# Patient Record
Sex: FEMALE | Race: WHITE | NOT HISPANIC OR LATINO | Employment: FULL TIME | URBAN - METROPOLITAN AREA
[De-identification: names, ages, dates, MRNs, and addresses within clinical notes are randomized per-mention and may not be internally consistent; named-entity substitution may affect disease eponyms.]

---

## 2021-01-28 ENCOUNTER — OFFICE VISIT (OUTPATIENT)
Dept: GASTROENTEROLOGY | Facility: CLINIC | Age: 36
End: 2021-01-28
Payer: COMMERCIAL

## 2021-01-28 VITALS
BODY MASS INDEX: 23.05 KG/M2 | DIASTOLIC BLOOD PRESSURE: 90 MMHG | HEIGHT: 64 IN | WEIGHT: 135 LBS | SYSTOLIC BLOOD PRESSURE: 141 MMHG | TEMPERATURE: 97.8 F | HEART RATE: 79 BPM

## 2021-01-28 DIAGNOSIS — K21.9 GASTROESOPHAGEAL REFLUX DISEASE, UNSPECIFIED WHETHER ESOPHAGITIS PRESENT: Primary | ICD-10-CM

## 2021-01-28 PROCEDURE — 99204 OFFICE O/P NEW MOD 45 MIN: CPT | Performed by: INTERNAL MEDICINE

## 2021-01-28 RX ORDER — CLOTRIMAZOLE AND BETAMETHASONE DIPROPIONATE 10; .64 MG/G; MG/G
CREAM TOPICAL
COMMUNITY
Start: 2020-12-23 | End: 2021-03-11

## 2021-01-28 RX ORDER — FAMOTIDINE 20 MG/1
20 TABLET, FILM COATED ORAL 2 TIMES DAILY
COMMUNITY
Start: 2021-01-14

## 2021-01-28 NOTE — PROGRESS NOTES
Consultation - [facility]  Gianna Hussein 37 y o  female MRN: [id number]  Unit/Bed#:  Encounter: [id number]        Consults    ASSESSMENT/PLAN:     1  GERD- longstanding symptoms, likely aggravated in setting of hiatal hernia or decreased LES pressure  She does have significant family history of esophageal cancer and Kumar's esophagus  Reports having undergone EGD 10 years ago, does not recall the results but does not think she had Kumar's esophagus  She has had symptoms on and off for the past 10 years, I would recommend EGD at this time to assess for Kumar's esophagus given chronicity of symptoms and family history  -  Given patient is asymptomatic can in a can hold off on H2 blocker however should she start having symptoms of acid reflux, would recommend daily use of Pepcid  I also recommended the need for EGD however patient would like to check if she is pregnant, patient is pregnant, will hold off on EGD during pregnancy  Would then schedule for endoscopy after  Patient is in agreement with plan  -  Meanwhile we went over anti-reflux measures in length  We discussed avoiding acidic foods, elevating the head of the bed, avoiding eating late at night, avoiding caffeine, carbonated drinks and alcohol   -  Will check CBC for anemia  Patient reports that she will be seeing her PCP for rest with blood work at [facility]       ______________________________________________________________________    Reason for Consult / Principal Problem: [unfilled]    HPI: Gianna Hussein is a 37y o  year old  Very pleasant female  With history of GERD presents for initial evaluation  Patient  Reports that she has had symptoms of acid reflux intermittently for the past 10 years  Patient reports that she underwent EGD 10 years ago    She reports that she has had symptoms of reflux on and off for many years and has been on Zantac followed by famotidine which has helped with the symptoms  Patient reports that she has been off of all medications recently and has been compliant with his diet which helps with the symptoms of acid reflux  Patient is mostly concerned as she does have significant family history of esophageal cancer in her paternal uncles  She reports that she had 2 uncles who passed away from esophageal cancer in 46s and 62s  She denies dysphagia, odynophagia, loss of appetite, early satiety, nausea or vomiting  She reports that she was having symptoms of acid reflux earlier this year but they have now subsided after short course of Pepcid and with dietary modifications  Does not report excessive use of NSAIDs  No melena  No recent labs  Patient reports that she has a planning on getting pregnant  No recent labs  Review of Systems: The remainder of the review of systems was negative except for the pertinent positives noted in HPI  Historical Information   Past Medical History:   Diagnosis Date    Acid reflux      History reviewed  No pertinent surgical history  Social History   Social History     Substance and Sexual Activity   Alcohol Use Yes    Frequency: 2-3 times a week    Drinks per session: 1 or 2     Social History     Substance and Sexual Activity   Drug Use Never     Social History     Tobacco Use   Smoking Status Never Smoker   Smokeless Tobacco Never Used     Family History   Problem Relation Age of Onset    Esophageal cancer Paternal Uncle     Stomach cancer Paternal Uncle     Esophageal cancer Paternal Uncle     Stomach cancer Paternal Uncle        Meds/Allergies     (Not in a hospital admission)    No current facility-administered medications for this visit  Allergies   Allergen Reactions    Dicloxacillin Hives    Nexium [Esomeprazole] Abdominal Pain       Objective     Blood pressure 141/90, pulse 79, temperature 97 8 °F (36 6 °C), height 5' 4" (1 626 m), weight 61 2 kg (135 lb)      [unfilled]    PHYSICAL EXAM     GEN: well nourished, well developed, no acute distress  HEENT: anicteric, MMM, no cervical or supraclavicular lymphadenopathy  CV: RRR, no m/r/g  CHEST: CTA b/l, no WRR  ABD: +BS, soft, NT/ND, no hepatosplenomegaly  EXT: no c/c/e  SKIN: no rashes,  NEURO: aaox3    Lab Results:   No visits with results within 1 Day(s) from this visit  Latest known visit with results is:   No results found for any previous visit       Imaging Studies: I have personally reviewed pertinent films in PACS

## 2021-01-28 NOTE — LETTER
January 28, 2021     Referral [address] [place] [id number]    Patient: Juliet Rousseau   YOB: 1985   Date of Visit: 1/28/2021       Dear Dr Jovanna Badillo:    Thank you for referring Juliet Rousseau to me for evaluation  Below are my notes for this consultation  If you have questions, please do not hesitate to call me  I look forward to following your patient along with you  Sincerely,        Ash Baires MD        CC: No Recipients  Ash Baires MD  1/28/2021 12:21 PM  Sign when Signing Visit  Consultation - [facility]  Juliet Rousseau 37 y o  female MRN: [id number]  Unit/Bed#:  Encounter: [id number]        Consults    ASSESSMENT/PLAN:     1  GERD- longstanding symptoms, likely aggravated in setting of hiatal hernia or decreased LES pressure  She does have significant family history of esophageal cancer and Kumar's esophagus  Reports having undergone EGD 10 years ago, does not recall the results but does not think she had Kumar's esophagus  She has had symptoms on and off for the past 10 years, I would recommend EGD at this time to assess for Kumar's esophagus given chronicity of symptoms and family history  -  Given patient is asymptomatic can in a can hold off on H2 blocker however should she start having symptoms of acid reflux, would recommend daily use of Pepcid  I also recommended the need for EGD however patient would like to check if she is pregnant, patient is pregnant, will hold off on EGD during pregnancy  Would then schedule for endoscopy after  Patient is in agreement with plan  -  Meanwhile we went over anti-reflux measures in length  We discussed avoiding acidic foods, elevating the head of the bed, avoiding eating late at night, avoiding caffeine, carbonated drinks and alcohol   -  Will check CBC for anemia     Patient reports that she will be seeing her PCP for rest with blood work at [facility]       ______________________________________________________________________    Reason for Consult / Principal Problem: [unfilled]    HPI: Leopoldo Richard is a 37y o  year old female  With history of GERD presents for initial evaluation  Patient  Reports that she has had symptoms of acid reflux intermittently for the past 10 years  Patient reports that she underwent EGD 10 years ago  She reports that she has had symptoms of reflux on and off for many years and has been on Zantac followed by famotidine which has helped with the symptoms  Patient reports that she has been off of all medications recently and has been compliant with his diet which helps with the symptoms of acid reflux  Patient is mostly concerned as she does have significant family history of esophageal cancer in her paternal uncles  She reports that she had 2 uncles who passed away from esophageal cancer in 46s and 62s  She denies dysphagia, odynophagia, loss of appetite, early satiety, nausea or vomiting  She reports that she was having symptoms of acid reflux earlier this year but they have now subsided after short course of Pepcid and with dietary modifications  Does not report excessive use of NSAIDs  No melena  No recent labs  Patient reports that she has a planning on getting pregnant  No recent labs  Review of Systems: The remainder of the review of systems was negative except for the pertinent positives noted in HPI  Historical Information   Past Medical History:   Diagnosis Date    Acid reflux      History reviewed  No pertinent surgical history    Social History   Social History     Substance and Sexual Activity   Alcohol Use Yes    Frequency: 2-3 times a week    Drinks per session: 1 or 2     Social History     Substance and Sexual Activity   Drug Use Never     Social History     Tobacco Use   Smoking Status Never Smoker   Smokeless Tobacco Never Used     Family History   Problem Relation Age of Onset    Esophageal cancer Paternal Uncle     Stomach cancer Paternal Uncle     Esophageal cancer Paternal Uncle     Stomach cancer Paternal Uncle        Meds/Allergies     (Not in a hospital admission)    No current facility-administered medications for this visit  Allergies   Allergen Reactions    Dicloxacillin Hives    Nexium [Esomeprazole] Abdominal Pain       Objective     Blood pressure 141/90, pulse 79, temperature 97 8 °F (36 6 °C), height 5' 4" (1 626 m), weight 61 2 kg (135 lb)  [unfilled]    PHYSICAL EXAM     GEN: well nourished, well developed, no acute distress  HEENT: anicteric, MMM, no cervical or supraclavicular lymphadenopathy  CV: RRR, no m/r/g  CHEST: CTA b/l, no WRR  ABD: +BS, soft, NT/ND, no hepatosplenomegaly  EXT: no c/c/e  SKIN: no rashes,  NEURO: aaox3    Lab Results:   No visits with results within 1 Day(s) from this visit  Latest known visit with results is:   No results found for any previous visit       Imaging Studies: I have personally reviewed pertinent films in PACS

## 2021-02-11 ENCOUNTER — OFFICE VISIT (OUTPATIENT)
Dept: DERMATOLOGY | Age: 36
End: 2021-02-11
Payer: COMMERCIAL

## 2021-02-11 VITALS — HEIGHT: 64 IN | TEMPERATURE: 97.5 F | BODY MASS INDEX: 22.98 KG/M2 | WEIGHT: 134.6 LBS

## 2021-02-11 DIAGNOSIS — D22.70 MULTIPLE BENIGN MELANOCYTIC NEVI OF UPPER EXTREMITY, LOWER EXTREMITY, AND TRUNK: Primary | ICD-10-CM

## 2021-02-11 DIAGNOSIS — Z80.8 FAMILY HISTORY OF SKIN CANCER: ICD-10-CM

## 2021-02-11 DIAGNOSIS — D22.5 MULTIPLE BENIGN MELANOCYTIC NEVI OF UPPER EXTREMITY, LOWER EXTREMITY, AND TRUNK: Primary | ICD-10-CM

## 2021-02-11 DIAGNOSIS — L81.4 SOLAR LENTIGO: ICD-10-CM

## 2021-02-11 DIAGNOSIS — D22.60 MULTIPLE BENIGN MELANOCYTIC NEVI OF UPPER EXTREMITY, LOWER EXTREMITY, AND TRUNK: Primary | ICD-10-CM

## 2021-02-11 DIAGNOSIS — D18.01 CHERRY ANGIOMA: ICD-10-CM

## 2021-02-11 PROCEDURE — 99204 OFFICE O/P NEW MOD 45 MIN: CPT | Performed by: DERMATOLOGY

## 2021-02-11 NOTE — PATIENT INSTRUCTIONS
LUNSFORD ANGIOMAS     Assessment and Plan:  Based on a thorough discussion of this condition and the management approach to it (including a comprehensive discussion of the known risks, side effects and potential benefits of treatment), the patient (family) agrees to implement the following specific plan:  · Reassure benign           SOLAR LENTIGINES      Assessment and Plan:  Based on a thorough discussion of this condition and the management approach to it (including a comprehensive discussion of the known risks, side effects and potential benefits of treatment), the patient (family) agrees to implement the following specific plan:  · Reassure benign  · Use sun protection  Apply SPF 30 or higher at least three times a day  Wear sun protecting clothing and hats  MULTIPLE MELANOCYTIC NEVI ("Moles")        Assessment and Plan:  Based on a thorough discussion of this condition and the management approach to it (including a comprehensive discussion of the known risks, side effects and potential benefits of treatment), the patient (family) agrees to implement the following specific plan:  · Reassure benign  · Monitor for changes  · Use sun protection  Apply SPF 30 or higher at least three times a day  Wear sun protecting clothing and hats  Worrisome signs of skin malignancy discussed, questions answered  Regular self-skin check discussed  Advised to call or return to office if patient notices any spots of concern, rapidly growing/changing lesions, bleeding lesions, non-healing lesions  Advised regular SPF use

## 2021-02-11 NOTE — PROGRESS NOTES
[facility] Note     Patient Name: Cuong Suarez  Encounter Date: 21     Have you been cared for by a [facility] Dermatologist in the last 3 years and, if so, which one? No    · Have you traveled outside of the [address] [place] [address] in the past 3 months or outside of the [facility] area in the last 2 weeks? No     May we call your Preferred Phone number to discuss your specific medical information? Yes     May we leave a detailed message that includes your specific medical information? Yes      Today's Chief Concerns:   Concern #1:  Skin exam   Concern #2:      Past Medical History:  Have you personally ever had or currently have any of the following? · Skin cancer (such as Melanoma, Basal Cell Carcinoma, Squamous Cell Carcinoma? (If Yes, please provide more detail)- No  · Eczema: No  · Psoriasis: No  · HIV/AIDS: No  · Hepatitis B or C: No  · Tuberculosis: No  · Systemic Immunosuppression such as Diabetes, Biologic or Immunotherapy, Chemotherapy, Organ Transplantation, Bone Marrow Transplantation (If YES, please provide more detail): No  · Radiation Treatment (If YES, please provide more detail): No  · Any other major medical conditions/concerns? (If Yes, which types)- Asthma    Social History:     What is/was your primary occupation? [employer]     What are your hobbies/past-times? Family History:  Have any of your "first degree relatives" (parent, brother, sister, or child) had any of the following       · Skin cancer such as Melanoma or Merkel Cell Carcinoma or Pancreatic Cancer? YES, father, Basal cell carcinoma  · Eczema, Asthma, Hay Fever or Seasonal Allergies: No  · Psoriasis or Psoriatic Arthritis: No  · Do any other medical conditions seem to run in your family? If Yes, what condition and which relatives?   YES, mother  cervical cancer    Current Medications:   (please update all dermatological medications before printing patient's AVS!)      Current Outpatient Medications:     famotidine (PEPCID) 20 mg tablet, , Disp: , Rfl:     Prenatal Vit-Fe Fumarate-FA (PRENATAL COMPLETE PO), Take by mouth, Disp: , Rfl:     clotrimazole-betamethasone (LOTRISONE) 1-0 05 % cream, , Disp: , Rfl:       Review of Systems:  Have you recently had or currently have any of the following? If YES, what are you doing for the problem? · Fever, chills or unintended weight loss: No  · Sudden loss or change in your vision: No  · Nausea, vomiting or blood in your stool: No  · Painful or swollen joints: No  · Wheezing or cough: No  · Changing mole or non-healing wound: No  · Nosebleeds: No  · Excessive sweating: No  · Easy or prolonged bleeding? No  · Over the last 2 weeks, how often have you been bothered by the following problems? · Taking little interest or pleasure in doing things: 1 - Not at All  · Feeling down, depressed, or hopeless: 1 - Not at All  · Rapid heartbeat with epinephrine:  No    · FEMALES ONLY:    · Are you pregnant or planning to become pregnant? YES, planning on becoming pregnant  · Are you currently or planning to be nursing or breast feeding? YES, planning on breast feeding    · Any known allergies? Allergies   Allergen Reactions    Dicloxacillin Hives    Nexium [Esomeprazole] Abdominal Pain   ·       Physical Exam:     Was a chaperone (Derm Clinical Assistant) present throughout the entire Physical Exam? Yes     Did the Dermatology Team specifically  the patient on the importance of a Full Skin Exam to be sure that nothing is missed clinically?  Yes}  o Did the patient ultimately request or accept a Full Skin Exam?  Yes  o Did the patient specifically refuse to have the areas "under-the-bra" examined by the Dermatologist? No  o Did the patient specifically refuse to have the areas "under-the-underwear" examined by the Dermatologist? No    CONSTITUTIONAL:   Vitals:    02/11/21 1301   Temp: 97 5 °F (36 4 °C)   TempSrc: Probe Weight: 61 1 kg (134 lb 9 6 oz)   Height: 5' 4" (1 626 m)           PSYCH: Normal mood and affect  EYES: Normal conjunctiva  ENT: Normal lips and oral mucosa  CARDIOVASCULAR: No edema  RESPIRATORY: Normal respirations  HEME/LYMPH/IMMUNO:  No regional lymphadenopathy except as noted below in "ASSESSMENT AND PLAN BY DIAGNOSIS"    SKIN:  FULL ORGAN SYSTEM EXAM   Hair, Scalp, Ears, Face Normal except as noted below in Assessment   Neck, Cervical Chain Nodes Normal except as noted below in Assessment   Right Arm/Hand/Fingers Normal except as noted below in Assessment   Left Arm/Hand/Fingers Normal except as noted below in Assessment   Chest/Breasts/Axillae Viewed areas Normal except as noted below in Assessment   Abdomen, Umbilicus Normal except as noted below in Assessment   Back/Spine Normal except as noted below in Assessment   Groin/Genitalia/Buttocks Normal except as noted below in Assessment   Right Leg, Foot, Toes Normal except as noted below in Assessment   Left Leg, Foot, Toes Normal except as noted below in Assessment        Assessment and Plan by Diagnosis:    History of Present Condition:     Duration:  How long has this been an issue for you?    o  nothing   Location Affected:  Where on the body is this affecting you?    o  N/A   Quality:  Is there any bleeding, pain, itch, burning/irritation, or redness associated with the skin lesion? o  no   Severity:  Describe any bleeding, pain, itch, burning/irritation, or redness on a scale of 1 to 10 (with 10 being the worst)    o  N/A   Timing:  Does this condition seem to be there pretty constantly or do you notice it more at specific times throughout the day?    o  no   Context:  Have you ever noticed that this condition seems to be associated with specific activities you do?    o  no   Modifying Factors:    o Anything that seems to make the condition worse?    -  no  o What have you tried to do to make the condition better?    -  no   Associated Signs and Symptoms:  Does this skin lesion seem to be associated with any of the following:  o nothing    CHERRY ANGIOMAS     Physical Exam:  · Anatomic Location Affected:  Trunk and extremites  · Morphological Description:  Scattered cherry red papules  · Denies pain, itch, bleeding  No treatments tried  Present for years  Present constantly; no modifying factors which make it worse or better  Assessment and Plan:  Based on a thorough discussion of this condition and the management approach to it (including a comprehensive discussion of the known risks, side effects and potential benefits of treatment), the patient (family) agrees to implement the following specific plan:  · Reassure benign           SOLAR LENTIGINES      Physical Exam:   Anatomic Location Affected:  Sun exposed areas of back, chest, arms, legs   Morphological Description:  Multiple scattered brown to tan evenly pigmented macules    Denies pain, itch, bleeding  No treatments tried  Present for months - years  Reports getting newer lesions with sun exposure  Assessment and Plan:  Based on a thorough discussion of this condition and the management approach to it (including a comprehensive discussion of the known risks, side effects and potential benefits of treatment), the patient (family) agrees to implement the following specific plan:  · Reassure benign  · Use sun protection  Apply SPF 30 or higher at least three times a day  Wear sun protecting clothing and hats  MULTIPLE MELANOCYTIC NEVI ("Moles")     Physical Exam:  · Anatomic Location Affected: Trunk and extremities  · Morphological Description:  Scattered, round to ovoid, symmetrical-appearing, even bordered, skin colored to dark brown macules/papules  · Denies pain, itch, bleeding  No treatments tried  Present for years  Present constantly; no modifying factors which make it worse or better  Denies actively changing or growing moles        Assessment and Plan:  Based on a thorough discussion of this condition and the management approach to it (including a comprehensive discussion of the known risks, side effects and potential benefits of treatment), the patient (family) agrees to implement the following specific plan:  · Reassure benign  · Monitor for changes  · Use sun protection  Apply SPF 30 or higher at least three times a day  Wear sun protecting clothing and hats  Worrisome signs of skin malignancy discussed, questions answered  Regular self-skin check discussed  Advised to call or return to office if patient notices any spots of concern, rapidly growing/changing lesions, bleeding lesions, non-healing lesions  Advised regular SPF use       Scribe Attestation    I,:  Brit Harmon am acting as a scribe while in the presence of the attending physician :       I,:  King Saunders MD personally performed the services described in this documentation    as scribed in my presence :

## 2021-02-12 LAB
BASOPHILS # BLD AUTO: 39 CELLS/UL (ref 0–200)
BASOPHILS NFR BLD AUTO: 0.7 %
EOSINOPHIL # BLD AUTO: 179 CELLS/UL (ref 15–500)
EOSINOPHIL NFR BLD AUTO: 3.2 %
ERYTHROCYTE [DISTWIDTH] IN BLOOD BY AUTOMATED COUNT: 12.8 % (ref 11–15)
HCT VFR BLD AUTO: 42.2 % (ref 35–45)
HGB BLD-MCNC: 14.2 G/DL (ref 11.7–15.5)
LYMPHOCYTES # BLD AUTO: 1798 CELLS/UL (ref 850–3900)
LYMPHOCYTES NFR BLD AUTO: 32.1 %
MCH RBC QN AUTO: 29.4 PG (ref 27–33)
MCHC RBC AUTO-ENTMCNC: 33.6 G/DL (ref 32–36)
MCV RBC AUTO: 87.4 FL (ref 80–100)
MONOCYTES # BLD AUTO: 392 CELLS/UL (ref 200–950)
MONOCYTES NFR BLD AUTO: 7 %
NEUTROPHILS # BLD AUTO: 3192 CELLS/UL (ref 1500–7800)
NEUTROPHILS NFR BLD AUTO: 57 %
PLATELET # BLD AUTO: 317 THOUSAND/UL (ref 140–400)
PMV BLD REES-ECKER: 10.4 FL (ref 7.5–12.5)
RBC # BLD AUTO: 4.83 MILLION/UL (ref 3.8–5.1)
WBC # BLD AUTO: 5.6 THOUSAND/UL (ref 3.8–10.8)

## 2021-03-10 DIAGNOSIS — Z11.59 SCREENING FOR VIRAL DISEASE: ICD-10-CM

## 2021-03-10 PROCEDURE — U0003 INFECTIOUS AGENT DETECTION BY NUCLEIC ACID (DNA OR RNA); SEVERE ACUTE RESPIRATORY SYNDROME CORONAVIRUS 2 (SARS-COV-2) (CORONAVIRUS DISEASE [COVID-19]), AMPLIFIED PROBE TECHNIQUE, MAKING USE OF HIGH THROUGHPUT TECHNOLOGIES AS DESCRIBED BY CMS-2020-01-R: HCPCS | Performed by: INTERNAL MEDICINE

## 2021-03-10 PROCEDURE — U0005 INFEC AGEN DETEC AMPLI PROBE: HCPCS | Performed by: INTERNAL MEDICINE

## 2021-03-11 LAB — SARS-COV-2 RNA RESP QL NAA+PROBE: NEGATIVE

## 2021-03-11 RX ORDER — FLUTICASONE PROPIONATE 110 UG/1
1 AEROSOL, METERED RESPIRATORY (INHALATION) 2 TIMES DAILY
COMMUNITY

## 2021-03-11 NOTE — PRE-PROCEDURE INSTRUCTIONS
Pre-Surgery Instructions:   Medication Instructions    famotidine (PEPCID) 20 mg tablet Instructed patient per Anesthesia Guidelines   fluticasone (FLOVENT HFA) 110 MCG/ACT inhaler Pt may use if needed    Prenatal Vit-Fe Fumarate-FA (PRENATAL COMPLETE PO) Instructed patient per Anesthesia Guidelines  Pt to follow Dr Mo Posada instructions     Denis Murphy [phone]

## 2021-03-15 ENCOUNTER — ANESTHESIA EVENT (OUTPATIENT)
Dept: GASTROENTEROLOGY | Facility: AMBULARY SURGERY CENTER | Age: 36
End: 2021-03-15

## 2021-03-16 ENCOUNTER — HOSPITAL ENCOUNTER (OUTPATIENT)
Dept: GASTROENTEROLOGY | Facility: AMBULARY SURGERY CENTER | Age: 36
Setting detail: OUTPATIENT SURGERY
Discharge: HOME | End: 2021-03-16
Attending: INTERNAL MEDICINE | Admitting: INTERNAL MEDICINE
Payer: COMMERCIAL

## 2021-03-16 ENCOUNTER — ANESTHESIA (OUTPATIENT)
Dept: GASTROENTEROLOGY | Facility: AMBULARY SURGERY CENTER | Age: 36
End: 2021-03-16

## 2021-03-16 VITALS
DIASTOLIC BLOOD PRESSURE: 54 MMHG | OXYGEN SATURATION: 97 % | SYSTOLIC BLOOD PRESSURE: 93 MMHG | HEART RATE: 69 BPM | TEMPERATURE: 97.5 F | WEIGHT: 134 LBS | RESPIRATION RATE: 18 BRPM | HEIGHT: 64 IN | BODY MASS INDEX: 22.88 KG/M2

## 2021-03-16 DIAGNOSIS — K21.9 GASTROESOPHAGEAL REFLUX DISEASE, UNSPECIFIED WHETHER ESOPHAGITIS PRESENT: ICD-10-CM

## 2021-03-16 PROBLEM — J45.909 ASTHMA: Status: ACTIVE | Noted: 2021-03-16

## 2021-03-16 LAB
EXT PREGNANCY TEST URINE: NEGATIVE
EXT. CONTROL: NORMAL

## 2021-03-16 PROCEDURE — 88305 TISSUE EXAM BY PATHOLOGIST: CPT | Performed by: PATHOLOGY

## 2021-03-16 PROCEDURE — 43239 EGD BIOPSY SINGLE/MULTIPLE: CPT | Performed by: INTERNAL MEDICINE

## 2021-03-16 PROCEDURE — 81025 URINE PREGNANCY TEST: CPT | Performed by: ANESTHESIOLOGY

## 2021-03-16 RX ORDER — PROPOFOL 10 MG/ML
INJECTION, EMULSION INTRAVENOUS AS NEEDED
Status: DISCONTINUED | OUTPATIENT
Start: 2021-03-16 | End: 2021-03-16

## 2021-03-16 RX ORDER — SODIUM CHLORIDE, SODIUM LACTATE, POTASSIUM CHLORIDE, CALCIUM CHLORIDE 600; 310; 30; 20 MG/100ML; MG/100ML; MG/100ML; MG/100ML
125 INJECTION, SOLUTION INTRAVENOUS CONTINUOUS
Status: DISCONTINUED | OUTPATIENT
Start: 2021-03-16 | End: 2021-03-20 | Stop reason: HOSPADM

## 2021-03-16 RX ORDER — ONDANSETRON 2 MG/ML
4 INJECTION INTRAMUSCULAR; INTRAVENOUS ONCE AS NEEDED
Status: CANCELLED | OUTPATIENT
Start: 2021-03-16

## 2021-03-16 RX ORDER — LIDOCAINE HYDROCHLORIDE 10 MG/ML
INJECTION, SOLUTION EPIDURAL; INFILTRATION; INTRACAUDAL; PERINEURAL AS NEEDED
Status: DISCONTINUED | OUTPATIENT
Start: 2021-03-16 | End: 2021-03-16

## 2021-03-16 RX ORDER — PROPOFOL 10 MG/ML
INJECTION, EMULSION INTRAVENOUS CONTINUOUS PRN
Status: DISCONTINUED | OUTPATIENT
Start: 2021-03-16 | End: 2021-03-16

## 2021-03-16 RX ORDER — SODIUM CHLORIDE, SODIUM LACTATE, POTASSIUM CHLORIDE, CALCIUM CHLORIDE 600; 310; 30; 20 MG/100ML; MG/100ML; MG/100ML; MG/100ML
INJECTION, SOLUTION INTRAVENOUS CONTINUOUS PRN
Status: DISCONTINUED | OUTPATIENT
Start: 2021-03-16 | End: 2021-03-16

## 2021-03-16 RX ADMIN — SODIUM CHLORIDE, SODIUM LACTATE, POTASSIUM CHLORIDE, AND CALCIUM CHLORIDE 125 ML/HR: .6; .31; .03; .02 INJECTION, SOLUTION INTRAVENOUS at 08:14

## 2021-03-16 RX ADMIN — PROPOFOL 150 MG: 10 INJECTION, EMULSION INTRAVENOUS at 08:34

## 2021-03-16 RX ADMIN — LIDOCAINE HYDROCHLORIDE 60 MG: 10 INJECTION, SOLUTION EPIDURAL; INFILTRATION; INTRACAUDAL; PERINEURAL at 08:34

## 2021-03-16 RX ADMIN — PROPOFOL 80 MCG/KG/MIN: 10 INJECTION, EMULSION INTRAVENOUS at 08:34

## 2021-03-16 NOTE — ANESTHESIA PREPROCEDURE EVALUATION
Procedure:  EGD    Relevant Problems   ANESTHESIA (within normal limits)      CARDIO (within normal limits)      ENDO (within normal limits)      GI/HEPATIC   (+) Gastroesophageal reflux disease      PULMONARY   (+) Asthma        Physical Exam    Airway    Mallampati score: I  TM Distance: >3 FB  Neck ROM: full     Dental   No notable dental hx     Cardiovascular  Rhythm: regular, Rate: normal,     Pulmonary  Breath sounds clear to auscultation,     Other Findings        Anesthesia Plan  ASA Score- 2     Anesthesia Type- IV sedation with anesthesia with ASA Monitors  Additional Monitors:   Airway Plan:           Plan Factors-Exercise tolerance (METS): >4 METS  Chart reviewed  Existing labs reviewed  Patient summary reviewed  Patient is not a current smoker  Induction- intravenous  Postoperative Plan-     Informed Consent- Anesthetic plan and risks discussed with patient  I personally reviewed this patient with the CRNA  Discussed and agreed on the Anesthesia Plan with the EVERETT Montes

## 2021-03-16 NOTE — ANESTHESIA POSTPROCEDURE EVALUATION
Post-Op Assessment Note    CV Status:  Stable  Pain Score: 0    Pain management: adequate     Mental Status:  Alert and awake   Hydration Status:  Euvolemic   PONV Controlled:  Controlled   Airway Patency:  Patent      Post Op Vitals Reviewed: Yes      Staff: CRNA         No complications documented      BP 95/53   Temp      Pulse  71   Resp   18   SpO2   99%

## 2021-03-16 NOTE — H&P
History and Physical - [facility]  David Chen 37 y o  female MRN: [id number]    HPI: David Chen is a 37y o  year old female who presents for evaluation of GERD  Review of Systems    Historical Information   Past Medical History:   Diagnosis Date    Anesthesia complication     difficulty waking up-Dr  "made up his own cocktail"    Asthma     allergy/seasonal allergy    GERD (gastroesophageal reflux disease)      Past Surgical History:   Procedure Laterality Date    APPENDECTOMY  2011    DILATION AND CURETTAGE OF UTERUS  2018    miscarriage    EGD      WISDOM TOOTH EXTRACTION      x4     Social History   Social History     Substance and Sexual Activity   Alcohol Use Yes    Frequency: 2-3 times a week    Drinks per session: 1 or 2     Social History     Substance and Sexual Activity   Drug Use Never     Social History     Tobacco Use   Smoking Status Never Smoker   Smokeless Tobacco Never Used     Family History   Problem Relation Age of Onset    Cancer Mother         cervical    Liver disease Father         x2 liver transplants    Diabetes Father     Hepatitis Father         C    Esophageal cancer Paternal Uncle     Stomach cancer Paternal Uncle     Esophageal cancer Paternal Uncle     Stomach cancer Paternal Uncle     Anxiety disorder Sister        Meds/Allergies     (Not in a hospital admission)      Allergies   Allergen Reactions    Dicloxacillin Hives    Nexium [Esomeprazole] Abdominal Pain    Strawberry C [Ascorbate] Hives       Objective     /65   Pulse 65   Temp 97 5 °F (36 4 °C) (Temporal)   Resp 18   Ht 5' 4" (1 626 m)   Wt 60 8 kg (134 lb)   LMP 03/09/2021 (Exact Date)   SpO2 100%   Breastfeeding No Comment: negative  BMI 23 00 kg/m²       PHYSICAL EXAM    Gen: NAD  CV: RRR  CHEST: Clear  ABD: soft, NT/ND  EXT: no edema  Neuro: AAO      ASSESSMENT/PLAN:  This is a 37y o  year old female here for GERD      PLAN:   Procedure: EGD

## 2021-03-24 DIAGNOSIS — K22.70 BARRETT'S ESOPHAGUS WITHOUT DYSPLASIA: Primary | ICD-10-CM

## 2021-03-24 RX ORDER — OMEPRAZOLE 40 MG/1
40 CAPSULE, DELAYED RELEASE ORAL DAILY
Qty: 30 CAPSULE | Refills: 3 | Status: SHIPPED | OUTPATIENT
Start: 2021-03-24

## 2022-07-12 ENCOUNTER — APPOINTMENT (EMERGENCY)
Dept: RADIOLOGY | Facility: HOSPITAL | Age: 37
End: 2022-07-12
Payer: COMMERCIAL

## 2022-07-12 ENCOUNTER — HOSPITAL ENCOUNTER (EMERGENCY)
Facility: HOSPITAL | Age: 37
Discharge: HOME | End: 2022-07-12
Attending: EMERGENCY MEDICINE
Payer: COMMERCIAL

## 2022-07-12 VITALS
BODY MASS INDEX: 23.29 KG/M2 | RESPIRATION RATE: 16 BRPM | OXYGEN SATURATION: 99 % | DIASTOLIC BLOOD PRESSURE: 79 MMHG | TEMPERATURE: 98.9 F | HEART RATE: 86 BPM | WEIGHT: 136.4 LBS | HEIGHT: 64 IN | SYSTOLIC BLOOD PRESSURE: 126 MMHG

## 2022-07-12 DIAGNOSIS — S93.609A FOOT SPRAIN: Primary | ICD-10-CM

## 2022-07-12 PROCEDURE — 99284 EMERGENCY DEPT VISIT MOD MDM: CPT | Performed by: EMERGENCY MEDICINE

## 2022-07-12 PROCEDURE — 73610 X-RAY EXAM OF ANKLE: CPT

## 2022-07-12 PROCEDURE — 73630 X-RAY EXAM OF FOOT: CPT

## 2022-07-12 PROCEDURE — 99283 EMERGENCY DEPT VISIT LOW MDM: CPT

## 2022-07-12 RX ORDER — DIPHENOXYLATE HYDROCHLORIDE AND ATROPINE SULFATE 2.5; .025 MG/1; MG/1
1 TABLET ORAL DAILY
COMMUNITY

## 2022-07-13 NOTE — ED PROVIDER NOTES
History  Chief Complaint   Patient presents with    Foot Injury     Pt  States 3 days ago fell into a pool  hitting her left leg on concrete, has scratches and scabs but states top of left foot hurts when resting and when putting pressure on it  HPI    This is a 32 yo F who presents today with left foot pain  States 3 days ago she tripped alongside the pool  She has some abrasions to her left knee and foot area  Patient states she did not have much pain  Pain was localized to the abrasions  Patient states the neck states she was doing fine  She had mild pain  States got a little bit swollen but tolerable  States woke up this morning pain was severe  Has issues cutting weight on her foot  Denies hitting her head  No other complaints  The abrasions are healing its own  Patient states pain localized to the swelling area  37 female that presents with left foot pain  Will do some x-rays    Prior to Admission Medications   Prescriptions Last Dose Informant Patient Reported? Taking?    Prenatal Vit-Fe Fumarate-FA (PRENATAL COMPLETE PO) Not Taking at Unknown time Self Yes No   Sig: Take by mouth every morning    Patient not taking: Reported on 2022   famotidine (PEPCID) 20 mg tablet Not Taking at Unknown time Self Yes No   Si mg 2 (two) times a day    Patient not taking: Reported on 2022   fluticasone (FLOVENT HFA) 110 MCG/ACT inhaler Past Month at Unknown time  Yes Yes   Sig: Inhale 1 puff 2 (two) times a day Rinse mouth after use    multivitamin (THERAGRAN) TABS 2022 at Unknown time  Yes Yes   Sig: Take 1 tablet by mouth daily   omeprazole (PriLOSEC) 40 MG capsule Not Taking at Unknown time  No No   Sig: Take 1 capsule (40 mg total) by mouth daily   Patient not taking: Reported on 2022      Facility-Administered Medications: None       Past Medical History:   Diagnosis Date    Anesthesia complication     difficulty waking up-Dr  "made up his own cocktail"    Asthma allergy/seasonal allergy    GERD (gastroesophageal reflux disease)        Past Surgical History:   Procedure Laterality Date    APPENDECTOMY  2011    DILATION AND CURETTAGE OF UTERUS  2018    miscarriage    EGD      WISDOM TOOTH EXTRACTION      x4       Family History   Problem Relation Age of Onset    Cancer Mother         cervical    Liver disease Father         x2 liver transplants    Diabetes Father     Hepatitis Father         C    Esophageal cancer Paternal Uncle     Stomach cancer Paternal Uncle     Esophageal cancer Paternal Uncle     Stomach cancer Paternal Uncle     Anxiety disorder Sister      I have reviewed and agree with the history as documented  E-Cigarette/Vaping    E-Cigarette Use Never User      E-Cigarette/Vaping Substances    Nicotine No     THC No     CBD No     Flavoring No     Other No     Unknown No      Social History     Tobacco Use    Smoking status: Never Smoker    Smokeless tobacco: Never Used   Vaping Use    Vaping Use: Never used   Substance Use Topics    Alcohol use: Yes     Comment: occasionally    Drug use: Never       Review of Systems   Constitutional: Negative for diaphoresis and fever  HENT: Negative for sneezing and sore throat  Eyes: Negative for photophobia and visual disturbance  Respiratory: Negative for cough, shortness of breath and wheezing  Cardiovascular: Negative for chest pain and palpitations  Gastrointestinal: Negative for abdominal pain, constipation, diarrhea, nausea and vomiting  Genitourinary: Negative for frequency, hematuria and urgency  Musculoskeletal: Negative for back pain  Left foot pain     Neurological: Negative for dizziness and weakness  Physical Exam  Physical Exam  Vitals and nursing note reviewed  Constitutional:       General: She is not in acute distress  Appearance: She is well-developed  HENT:      Head: Normocephalic and atraumatic     Eyes:      Conjunctiva/sclera: Conjunctivae normal    Cardiovascular:      Rate and Rhythm: Normal rate and regular rhythm  Heart sounds: No murmur heard  Pulmonary:      Effort: Pulmonary effort is normal  No respiratory distress  Breath sounds: Normal breath sounds  Abdominal:      Palpations: Abdomen is soft  Tenderness: There is no abdominal tenderness  Musculoskeletal:      Cervical back: Neck supple  Comments: Left foot lateral mallelous swelling but no tenderness  Tenderness mid foot  superfiical abrasion on foot healing  No signs of infection  Normal DP pulses  Soft compartments  Left knee superficial abrasion, no tenderness or swelling  Skin:     General: Skin is warm and dry  Capillary Refill: Capillary refill takes less than 2 seconds  Neurological:      General: No focal deficit present  Mental Status: She is alert  Psychiatric:         Mood and Affect: Mood normal          Vital Signs  ED Triage Vitals [07/12/22 2133]   Temperature Pulse Respirations Blood Pressure SpO2   98 9 °F (37 2 °C) 86 16 126/79 99 %      Temp Source Heart Rate Source Patient Position - Orthostatic VS BP Location FiO2 (%)   Tympanic -- -- -- --      Pain Score       4           Vitals:    07/12/22 2133   BP: 126/79   Pulse: 86         Visual Acuity      ED Medications  Medications - No data to display    Diagnostic Studies  Results Reviewed     None                 XR foot 3+ views LEFT   Final Result by Nelida Causey MD (07/13 0555)      Possible fracture of the base of the 3rd metatarsal on the oblique view  Fracture not excluded  Further assessment recommended  Soft tissue swelling over the metatarsal phalangeal joint region  Workstation performed: [id number]         XR ankle 3+ views LEFT   Final Result by Livia Gomes MD (07/13 8495)      No acute osseous abnormality              Workstation performed: [id number]                    Procedures  Procedures         ED Course MDM    Disposition  Final diagnoses: Foot sprain     Time reflects when diagnosis was documented in both MDM as applicable and the Disposition within this note     Time User Action Codes Description Comment    7/12/2022 10:45 PM [address], [place]zaiah [place] [address]  [address]  [J73 [id number]] Foot sprain       ED Disposition     ED Disposition   Discharge    Condition   Stable    Date/Time   Tue Jul 12, 2022 10:45 PM    Comment   Luzma Escort discharge to home/self care  Follow-up Information     Follow up With Specialties Details Why Contact Info Additional [address] [facility] Orthopedic Surgery Schedule an appointment as soon as possible for a visit   [address], [place] [address] [address] [address] [place] [facility], [address] [facility] [address], [place], [place], [address] [place] [address]          Discharge Medication List as of 7/12/2022 10:45 PM      CONTINUE these medications which have NOT CHANGED    Details   famotidine (PEPCID) 20 mg tablet 20 mg 2 (two) times a day , Starting Thu 1/14/2021, Historical Med      fluticasone (FLOVENT HFA) 110 MCG/ACT inhaler Inhale 1 puff 2 (two) times a day Rinse mouth after use , Historical Med      multivitamin (THERAGRAN) TABS Take 1 tablet by mouth daily, Historical Med      omeprazole (PriLOSEC) 40 MG capsule Take 1 capsule (40 mg total) by mouth daily, Starting Wed 3/24/2021, Normal      Prenatal Vit-Fe Fumarate-FA (PRENATAL COMPLETE PO) Take by mouth every morning , Historical Med             No discharge procedures on file      PDMP Review     None          ED Provider  Electronically Signed by           Ce Mcdonough MD  07/14/22 9174

## 2022-07-15 ENCOUNTER — OFFICE VISIT (OUTPATIENT)
Dept: OBGYN CLINIC | Facility: CLINIC | Age: 37
End: 2022-07-15
Payer: COMMERCIAL

## 2022-07-15 VITALS
HEART RATE: 78 BPM | HEIGHT: 64 IN | SYSTOLIC BLOOD PRESSURE: 125 MMHG | TEMPERATURE: 98.2 F | BODY MASS INDEX: 23.41 KG/M2 | DIASTOLIC BLOOD PRESSURE: 75 MMHG | RESPIRATION RATE: 19 BRPM

## 2022-07-15 DIAGNOSIS — S92.335A CLOSED NONDISPLACED FRACTURE OF THIRD METATARSAL BONE OF LEFT FOOT, INITIAL ENCOUNTER: Primary | ICD-10-CM

## 2022-07-15 PROCEDURE — 99204 OFFICE O/P NEW MOD 45 MIN: CPT | Performed by: PHYSICIAN ASSISTANT

## 2022-07-15 NOTE — PROGRESS NOTES
Assessment/Plan:  1  Closed nondisplaced fracture of third metatarsal bone of left foot, initial encounter       The patient does have a subtle fracture of the base of 3rd metatarsal   She was given a short cam boot to wear today  She may weightbear as tolerated  She should elevate the foot to help her swelling  She has no signs of DVT today  She will call immediately if she develops any calf pain or cramping  She will follow up in 4 weeks with repeat x-rays of her foot  Subjective:   Norberto Fothergill is a 31 y o  female who presents today for evaluation of her left foot  She fell into a pool on Sunday injuring her foot  She did go to the emergency department and had x-rays of the foot and ankle a few days after her injury  Foot xrays showed a questionable fracture of the base of the 3rd metatarsal   Ankle x-ray showed no acute osseous abnormality  She notes pain about the dorsum of the foot which is worse with ambulation and activity and better with rest   She has been using crutches which helps  She notes good sensation of the left lower extremity and denies any calf pain or cramping  She does complain of some swelling into the foot  Review of Systems   Constitutional: Negative  Negative for chills and fever  HENT: Negative  Negative for ear pain and sore throat  Eyes: Negative  Negative for pain and redness  Respiratory: Negative  Negative for shortness of breath and wheezing  Cardiovascular: Negative for chest pain and palpitations  Gastrointestinal: Negative  Negative for abdominal pain and blood in stool  Endocrine: Negative  Negative for polydipsia and polyuria  Genitourinary: Negative  Negative for difficulty urinating and dysuria  Musculoskeletal:        As noted in HPI   Skin: Negative  Negative for pallor and rash  Neurological: Negative  Negative for dizziness and numbness  Hematological: Negative  Negative for adenopathy  Does not bruise/bleed easily  Psychiatric/Behavioral: Negative  Negative for confusion and suicidal ideas           Past Medical History:   Diagnosis Date    Anesthesia complication     difficulty waking up-Dr  "made up his own cocktail"    Asthma     allergy/seasonal allergy    GERD (gastroesophageal reflux disease)        Past Surgical History:   Procedure Laterality Date    APPENDECTOMY  2011    DILATION AND CURETTAGE OF UTERUS  2018    miscarriage    EGD      WISDOM TOOTH EXTRACTION      x4       Family History   Problem Relation Age of Onset    Cancer Mother         cervical    Liver disease Father         x2 liver transplants    Diabetes Father     Hepatitis Father         C    Esophageal cancer Paternal Uncle     Stomach cancer Paternal Uncle     Esophageal cancer Paternal Uncle     Stomach cancer Paternal Uncle     Anxiety disorder Sister        Social History     Occupational History    Not on file   Tobacco Use    Smoking status: Never Smoker    Smokeless tobacco: Never Used   Vaping Use    Vaping Use: Never used   Substance and Sexual Activity    Alcohol use: Yes     Comment: occasionally    Drug use: Never    Sexual activity: Yes     Partners: Male     Birth control/protection: None         Current Outpatient Medications:     fluticasone (FLOVENT HFA) 110 MCG/ACT inhaler, Inhale 1 puff 2 (two) times a day Rinse mouth after use , Disp: , Rfl:     multivitamin (THERAGRAN) TABS, Take 1 tablet by mouth daily, Disp: , Rfl:     famotidine (PEPCID) 20 mg tablet, 20 mg 2 (two) times a day  (Patient not taking: Reported on 7/12/2022), Disp: , Rfl:     omeprazole (PriLOSEC) 40 MG capsule, Take 1 capsule (40 mg total) by mouth daily (Patient not taking: Reported on 7/12/2022), Disp: 30 capsule, Rfl: 3    Prenatal Vit-Fe Fumarate-FA (PRENATAL COMPLETE PO), Take by mouth every morning  (Patient not taking: Reported on 7/12/2022), Disp: , Rfl:     Allergies   Allergen Reactions    Dicloxacillin Hives    Nexium [Esomeprazole] Abdominal Pain    Strawberry C [Ascorbate - Food Allergy] Hives       Objective:  Vitals:    07/15/22 1122   BP: 125/75   Pulse: 78   Resp: 19   Temp: 98 2 °F (36 8 °C)       Ortho Exam    Physical Exam  Constitutional:       General: She is not in acute distress  Appearance: She is well-developed  HENT:      Head: Normocephalic and atraumatic  Eyes:      General: No scleral icterus  Conjunctiva/sclera: Conjunctivae normal    Neck:      Vascular: No JVD  Cardiovascular:      Rate and Rhythm: Normal rate  Pulmonary:      Effort: Pulmonary effort is normal  No respiratory distress  Skin:     General: Skin is warm  Neurological:      Mental Status: She is alert and oriented to person, place, and time  Coordination: Coordination normal      Left foot:  Abrasions dorsum of foot with mild diffuse swelling of the dorsum of the foot  Tenderness base of 3rd metatarsal   No tenderness about the ankle or swelling about the ankle  Sensation intact left lower extremity  Patient moves ankle freely with minimal discomfort  2+ DP pulse      I have personally reviewed pertinent films in PACS and my interpretation is as follows:  X-rays left ankle:  No acute osseous abnormality  X-rays left foot:  Fracture base of 3rd metatarsal  nondisplaced

## 2022-08-10 ENCOUNTER — OFFICE VISIT (OUTPATIENT)
Dept: OBGYN CLINIC | Facility: CLINIC | Age: 37
End: 2022-08-10
Payer: COMMERCIAL

## 2022-08-10 ENCOUNTER — APPOINTMENT (OUTPATIENT)
Dept: RADIOLOGY | Facility: CLINIC | Age: 37
End: 2022-08-10
Payer: COMMERCIAL

## 2022-08-10 VITALS
HEART RATE: 84 BPM | DIASTOLIC BLOOD PRESSURE: 71 MMHG | TEMPERATURE: 98.4 F | SYSTOLIC BLOOD PRESSURE: 113 MMHG | HEIGHT: 64 IN | BODY MASS INDEX: 23.05 KG/M2 | WEIGHT: 135 LBS

## 2022-08-10 DIAGNOSIS — S92.335A CLOSED NONDISPLACED FRACTURE OF THIRD METATARSAL BONE OF LEFT FOOT, INITIAL ENCOUNTER: ICD-10-CM

## 2022-08-10 DIAGNOSIS — S92.335D CLOSED NONDISPLACED FRACTURE OF THIRD METATARSAL BONE OF LEFT FOOT WITH ROUTINE HEALING, SUBSEQUENT ENCOUNTER: Primary | ICD-10-CM

## 2022-08-10 PROCEDURE — 99213 OFFICE O/P EST LOW 20 MIN: CPT | Performed by: PHYSICIAN ASSISTANT

## 2022-08-10 PROCEDURE — 73630 X-RAY EXAM OF FOOT: CPT

## 2022-08-10 NOTE — PROGRESS NOTES
Assessment/Plan:  1  Closed nondisplaced fracture of third metatarsal bone of left foot with routine healing, subsequent encounter  XR foot 3+ vw left     The patient is doing well and can continue to ambulate in regular shoes  Did discuss that likely it will be another 4-6 weeks until she feels comfortable doing any impact type activities on this foot like running or jumping  She can gradually increase her activity though  If doing well, she will follow up as needed  We did discuss that swelling last for many months after this injury  Subjective:   Linn Roa is a 31 y o  female who presents Today for follow-up of her left foot  , now about a month status post closed treatment of 3rd metatarsal base fracture  The patient discontinued her Cam boot recently and has been ambulating in regular shoes  She notes minimal discomfort about the foot  She still notes some mild swelling with prolonged activity  She notes good sensation of the left lower extremity and denies any calf pain or cramping  Review of Systems   Constitutional: Negative  Negative for chills and fever  HENT: Negative  Negative for ear pain and sore throat  Eyes: Negative  Negative for pain and redness  Respiratory: Negative  Negative for shortness of breath and wheezing  Cardiovascular: Negative for chest pain and palpitations  Gastrointestinal: Negative  Negative for abdominal pain and blood in stool  Endocrine: Negative  Negative for polydipsia and polyuria  Genitourinary: Negative  Negative for difficulty urinating and dysuria  Musculoskeletal:        As noted in HPI   Skin: Negative  Negative for pallor and rash  Neurological: Negative  Negative for dizziness and numbness  Hematological: Negative  Negative for adenopathy  Does not bruise/bleed easily  Psychiatric/Behavioral: Negative  Negative for confusion and suicidal ideas           Past Medical History:   Diagnosis Date    Anesthesia complication     difficulty waking up-  "made up his own cocktail"    Asthma     allergy/seasonal allergy    GERD (gastroesophageal reflux disease)        Past Surgical History:   Procedure Laterality Date    APPENDECTOMY  2011    DILATION AND CURETTAGE OF UTERUS  2018    miscarriage    EGD      WISDOM TOOTH EXTRACTION      x4       Family History   Problem Relation Age of Onset    Cancer Mother         cervical    Liver disease Father         x2 liver transplants    Diabetes Father     Hepatitis Father         C    Esophageal cancer Paternal Uncle     Stomach cancer Paternal Uncle     Esophageal cancer Paternal Uncle     Stomach cancer Paternal Uncle     Anxiety disorder Sister        Social History     Occupational History    Not on file   Tobacco Use    Smoking status: Never Smoker    Smokeless tobacco: Never Used   Vaping Use    Vaping Use: Never used   Substance and Sexual Activity    Alcohol use: Yes     Comment: occasionally    Drug use: Never    Sexual activity: Yes     Partners: Male     Birth control/protection: None         Current Outpatient Medications:     famotidine (PEPCID) 20 mg tablet, 20 mg 2 (two) times a day  (Patient not taking: Reported on 7/12/2022), Disp: , Rfl:     fluticasone (FLOVENT HFA) 110 MCG/ACT inhaler, Inhale 1 puff 2 (two) times a day Rinse mouth after use , Disp: , Rfl:     multivitamin (THERAGRAN) TABS, Take 1 tablet by mouth daily, Disp: , Rfl:     omeprazole (PriLOSEC) 40 MG capsule, Take 1 capsule (40 mg total) by mouth daily (Patient not taking: Reported on 7/12/2022), Disp: 30 capsule, Rfl: 3    Prenatal Vit-Fe Fumarate-FA (PRENATAL COMPLETE PO), Take by mouth every morning  (Patient not taking: Reported on 7/12/2022), Disp: , Rfl:     Allergies   Allergen Reactions    Dicloxacillin Hives    Nexium [Esomeprazole] Abdominal Pain    Strawberry C [Ascorbate - Food Allergy] Hives       Objective:  Vitals:    08/10/22 0840   BP: 113/71   Pulse: 84 Temp: 98 4 °F (36 9 °C)       Ortho Exam    Physical Exam  Constitutional:       General: She is not in acute distress  Appearance: She is well-developed  HENT:      Head: Normocephalic and atraumatic  Eyes:      General: No scleral icterus  Conjunctiva/sclera: Conjunctivae normal    Neck:      Vascular: No JVD  Cardiovascular:      Rate and Rhythm: Normal rate  Pulmonary:      Effort: Pulmonary effort is normal  No respiratory distress  Skin:     General: Skin is warm  Neurological:      Mental Status: She is alert and oriented to person, place, and time  Coordination: Coordination normal      Left foot:  Benign appearance  No obvious swelling, erythema, or ecchymosis  Mild tenderness 3rd metatarsal   Patient moves all toes freely  Sensation intact left lower extremity  No calf tenderness  2+ DP pulse      I have personally reviewed pertinent films in PACS and my interpretation is as follows:  X-rays left foot:  Healing 3rd metatarsal base fracture

## 2022-10-24 ENCOUNTER — OFFICE VISIT (OUTPATIENT)
Dept: DERMATOLOGY | Age: 37
End: 2022-10-24
Payer: COMMERCIAL

## 2022-10-24 ENCOUNTER — COSMETIC (OUTPATIENT)
Dept: DERMATOLOGY | Age: 37
End: 2022-10-24

## 2022-10-24 VITALS — BODY MASS INDEX: 23.21 KG/M2 | WEIGHT: 135.2 LBS

## 2022-10-24 DIAGNOSIS — L72.0 MILIUM: Primary | ICD-10-CM

## 2022-10-24 PROCEDURE — SKNTGDERM SKIN TAG DERM ADD ON: Performed by: DERMATOLOGY

## 2022-10-24 PROCEDURE — 99212 OFFICE O/P EST SF 10 MIN: CPT | Performed by: DERMATOLOGY

## 2022-10-24 NOTE — PROGRESS NOTES
MILIUM     Physical Exam:  • Anatomic Location Affected:  Corner of left eye   • Morphological Description:  Whitish papule   Assessment and Plan:  Based on a thorough discussion of this condition and the management approach to it (including a comprehensive discussion of the known risks, side effects and potential benefits of treatment), the patient (family) agrees to implement the following specific plan:  • Extraction done in office today   • Follow up as needed     Assessment and Plan  A milium is a small cyst containing keratin (the skin protein); they are usually multiple and are then known as milia  These harmless cysts present as tiny pearly-white bumps just under the surface of the skin  Milia are common in all ages and both sexes  They most often arise on the face and are particularly prominent on the eyelids and cheeks, but they may occur elsewhere  There are various kinds of milia  PROCEDURE:  DESTRUCTION OF BENIGN LESIONS WITH extraction   After a thorough discussion of treatment options and risk/benefits/alternatives (including but not limited to local pain, scarring, dyspigmentation, blistering, and possible superinfection), verbal and written consent were obtained and the aforementioned lesions were treated on with 11 blade and Q-tip  TOTAL NUMBER of 1 lesions were treated today on the ANATOMIC LOCATION: corner of left eye   The patient tolerated the procedure well, and after-care instructions were provided        DO NOT BILL  INSURANCE  Scribe Attestation    I,:  Anitha Silva am acting as a scribe while in the presence of the attending physician :       I,:  Johnson Billings MD personally performed the services described in this documentation    as scribed in my presence :

## 2022-10-24 NOTE — PATIENT INSTRUCTIONS
MILIUM     Assessment and Plan:  Based on a thorough discussion of this condition and the management approach to it (including a comprehensive discussion of the known risks, side effects and potential benefits of treatment), the patient (family) agrees to implement the following specific plan:  Removal discussed (cosmetic $20 each)

## 2022-10-24 NOTE — PROGRESS NOTES
[facility]     Patient Name: Catrachito Leavitt  Encounter Date: 10/24/2022     Have you been cared for by a [place] [address] Dermatologist in the last 3 years and, if so, which description applies to you? YES  I HAVE been seen here within the last 3 years, and my medical history HAS changed  I am FEMALE/of child-bearing potential     REVIEW OF SYSTEMS:  Have you recently had or currently have any of the following? · Recent fever or chills? No  · Any non-healing wound? YES, white spot   · Are you pregnant or planning to become pregnant? No  · Are you currently or planning to be nursing or breast feeding? No   PAST MEDICAL HISTORY:  Have you personally ever had or currently have any of the following? If "YES," then please provide more detail  · Skin cancer (such as Melanoma, Basal Cell Carcinoma, Squamous Cell Carcinoma? No  · Tuberculosis, HIV/AIDS, Hepatitis B or C: No  · Systemic Immunosuppression such as Diabetes, Biologic or Immunotherapy, Chemotherapy, Organ Transplantation, Bone Marrow Transplantation No  · Radiation Treatment No   FAMILY HISTORY:  Any "first degree relatives" (parent, brother, sister, or child) with the following? • Skin Cancer, Pancreatic or Other Cancer? YES, father with bcc   PATIENT EXPERIENCE:    • Do you want the Dermatologist to perform a COMPLETE skin exam today including a clinical examination under the "bra and underwear" areas? NO  • If necessary, do we have your permission to call and leave a detailed message on your Preferred Phone number that includes your specific medical information?   Yes      Allergies   Allergen Reactions   • Dicloxacillin Hives   • Nexium [Esomeprazole] Abdominal Pain   • Strawberry C [Ascorbate - Food Allergy] Hives      Current Outpatient Medications:   •  fluticasone (FLOVENT HFA) 110 MCG/ACT inhaler, Inhale 1 puff 2 (two) times a day Rinse mouth after use , Disp: , Rfl:   •  multivitamin (THERAGRAN) TABS, Take 1 tablet by mouth daily, Disp: , Rfl:   •  famotidine (PEPCID) 20 mg tablet, 20 mg 2 (two) times a day  (Patient not taking: Reported on 2022), Disp: , Rfl:   •  omeprazole (PriLOSEC) 40 MG capsule, Take 1 capsule (40 mg total) by mouth daily (Patient not taking: Reported on 2022), Disp: 30 capsule, Rfl: 3  •  Prenatal Vit-Fe Fumarate-FA (PRENATAL COMPLETE PO), Take by mouth every morning  (Patient not taking: Reported on 2022), Disp: , Rfl:           • Whom besides the patient is providing clinical information about today's encounter?   o NO ADDITIONAL HISTORIAN (patient alone provided history)    Physical Exam and Assessment/Plan by Diagnosis:    MILIUM     Physical Exam:  • Anatomic Location Affected:  Corner of left eye   • Morphological Description:  White papule   • Pertinent Positives:  • Pertinent Negatives: Additional History of Present Condition:  Present for 1 month  No tx or prior tx done  Denies any syptoms     Assessment and Plan:  Based on a thorough discussion of this condition and the management approach to it (including a comprehensive discussion of the known risks, side effects and potential benefits of treatment), the patient (family) agrees to implement the following specific plan:  • Removal discussed (cosmetic $20 each)    Assessment and Plan  A milium is a small cyst containing keratin (the skin protein); they are usually multiple and are then known as milia  These harmless cysts present as tiny pearly-white bumps just under the surface of the skin  Milia are common in all ages and both sexes  They most often arise on the face and are particularly prominent on the eyelids and cheeks, but they may occur elsewhere  There are various kinds of milia    •  milia: Affect 40-50% of  babies, few to numerous lesions, often seen on the nose, but may also arise inside the mouth on the mucosa (Ashok pearls) or palate (Maritza nodules) or more widely on the scalp, face and upper trunk, Heal spontaneously within a few weeks of birth  • Primary milia in children and adults: found around eyelids, cheeks, forehead and genitalia, in young children, a row of milia may appear along the nasal crease, may clear in a few weeks or persist for months or longer  • Juvenile milia: associated with Rombo syndrome, basal cell naevus syndrome, Vtctq-Oubic-Xtmscgor syndrome, pachyonychia congenita, Souza syndrome and other genetic disorders, may be congenital (present at birth) or appear later in life  • Milia en plaque: multiple milia appear on within an inflamed plaque up to several centimeters in diameter, usually found on an eyelid, behind the ear, on a cheek or jaw  3  Affect children and adults, especially middle-aged women  4  Sometimes associated with another skin disease including pseudoxanthoma elasticum, discoid lupus erythematosus, lichen planus  • Multiple eruptive milia: crops of numerous milia appear over a few weeks to months, lesions may be asymptomatic or itchy, most often affect the face, upper arms and upper trunk  • Traumatic milia: occur at the site of injury as skin heals, arise from eccrine sweat ducts, examples include thermal burns, dermabrasion, blistering rashes such as bullous pemphigoid, often seen on the back of hands and fingers in porphyria cutanea tarda, a milia-like calcified nodule may develop after  heel stick blood test    • Milia associated with drugs: may rarely follow the use of topical medication, such as phenols, hydroquinone, 5-fluorouracil cream, and a corticosteroid  Milia have a characteristic appearance  However, on occasion, a skin biopsy may be performed  This shows a small epidermoid cyst coming from a vellus hair follicle  Milia should be distinguished from other types of cyst, comedones, xanthelasma and syringomas  Colloid milia are yang coloured bumps on cheeks and temples associated with excessive exposure to sunlight    They should also be distinguished from milia-like cysts noted on dermoscopy in seborrhoeic keratoses, papillomatous moles and some basal cell carcinomas  Milia do not need to be treated unless they are a cause for concern for the patient  They often clear up by themselves within a few months  Where possible, further trauma should be minimised to reduce the development of new lesions  • The lesion may be de-roofed using a sterile needle or blade and the contents squeezed or pricked out  • They may be destroyed using diathermy and curettage, or cryotherapy  • For widespread lesions, topical retinoids may be helpful  • Chemical peels, dermabrasion and laser ablation have been reported to be effective when used for very extensive milia  • Milia en plaque may improve with minocycline (a tetracycline antibiotic)      Scribe Attestation    I,:  Anitha Silva am acting as a scribe while in the presence of the attending physician :       I,:  Johnson Billings MD personally performed the services described in this documentation    as scribed in my presence :

## 2023-02-14 ENCOUNTER — TELEPHONE (OUTPATIENT)
Dept: DERMATOLOGY | Facility: CLINIC | Age: 38
End: 2023-02-14

## 2023-02-14 NOTE — TELEPHONE ENCOUNTER
pt left v/m to cancel and wanted to reschedule, called pt but n/a, left v/m to c/b to get appt rescheduled

## 2023-09-10 ENCOUNTER — HOSPITAL ENCOUNTER (EMERGENCY)
Facility: HOSPITAL | Age: 38
Discharge: HOME | End: 2023-09-10
Attending: EMERGENCY MEDICINE
Payer: COMMERCIAL

## 2023-09-10 ENCOUNTER — APPOINTMENT (EMERGENCY)
Dept: RADIOLOGY | Facility: HOSPITAL | Age: 38
End: 2023-09-10
Payer: COMMERCIAL

## 2023-09-10 VITALS
SYSTOLIC BLOOD PRESSURE: 105 MMHG | RESPIRATION RATE: 18 BRPM | HEART RATE: 72 BPM | TEMPERATURE: 98.4 F | OXYGEN SATURATION: 100 % | DIASTOLIC BLOOD PRESSURE: 59 MMHG

## 2023-09-10 DIAGNOSIS — R07.9 CHEST PAIN: Primary | ICD-10-CM

## 2023-09-10 LAB
2HR DELTA HS TROPONIN: -1 NG/L
ALBUMIN SERPL BCP-MCNC: 4.6 G/DL (ref 3.5–5)
ALP SERPL-CCNC: 44 U/L (ref 34–104)
ALT SERPL W P-5'-P-CCNC: 13 U/L (ref 7–52)
ANION GAP SERPL CALCULATED.3IONS-SCNC: 5 MMOL/L
AST SERPL W P-5'-P-CCNC: 15 U/L (ref 13–39)
BASOPHILS # BLD AUTO: 0.03 THOUSANDS/ÂΜL (ref 0–0.1)
BASOPHILS NFR BLD AUTO: 1 % (ref 0–1)
BILIRUB SERPL-MCNC: 0.58 MG/DL (ref 0.2–1)
BUN SERPL-MCNC: 14 MG/DL (ref 5–25)
CALCIUM SERPL-MCNC: 9.4 MG/DL (ref 8.4–10.2)
CARDIAC TROPONIN I PNL SERPL HS: 2 NG/L
CARDIAC TROPONIN I PNL SERPL HS: 3 NG/L
CHLORIDE SERPL-SCNC: 104 MMOL/L (ref 96–108)
CO2 SERPL-SCNC: 28 MMOL/L (ref 21–32)
CREAT SERPL-MCNC: 0.69 MG/DL (ref 0.6–1.3)
EOSINOPHIL # BLD AUTO: 0.16 THOUSAND/ÂΜL (ref 0–0.61)
EOSINOPHIL NFR BLD AUTO: 3 % (ref 0–6)
ERYTHROCYTE [DISTWIDTH] IN BLOOD BY AUTOMATED COUNT: 12.9 % (ref 11.6–15.1)
EXT PREGNANCY TEST URINE: NEGATIVE
EXT. CONTROL: NORMAL
GFR SERPL CREATININE-BSD FRML MDRD: 112 ML/MIN/1.73SQ M
GLUCOSE SERPL-MCNC: 85 MG/DL (ref 65–140)
HCT VFR BLD AUTO: 40.9 % (ref 34.8–46.1)
HGB BLD-MCNC: 13.9 G/DL (ref 11.5–15.4)
IMM GRANULOCYTES # BLD AUTO: 0.02 THOUSAND/UL (ref 0–0.2)
IMM GRANULOCYTES NFR BLD AUTO: 0 % (ref 0–2)
LYMPHOCYTES # BLD AUTO: 2.28 THOUSANDS/ÂΜL (ref 0.6–4.47)
LYMPHOCYTES NFR BLD AUTO: 35 % (ref 14–44)
MCH RBC QN AUTO: 29.8 PG (ref 26.8–34.3)
MCHC RBC AUTO-ENTMCNC: 34 G/DL (ref 31.4–37.4)
MCV RBC AUTO: 88 FL (ref 82–98)
MONOCYTES # BLD AUTO: 0.49 THOUSAND/ÂΜL (ref 0.17–1.22)
MONOCYTES NFR BLD AUTO: 8 % (ref 4–12)
NEUTROPHILS # BLD AUTO: 3.55 THOUSANDS/ÂΜL (ref 1.85–7.62)
NEUTS SEG NFR BLD AUTO: 53 % (ref 43–75)
NRBC BLD AUTO-RTO: 0 /100 WBCS
PLATELET # BLD AUTO: 288 THOUSANDS/UL (ref 149–390)
PMV BLD AUTO: 9.8 FL (ref 8.9–12.7)
POTASSIUM SERPL-SCNC: 3.6 MMOL/L (ref 3.5–5.3)
PROT SERPL-MCNC: 6.5 G/DL (ref 6.4–8.4)
RBC # BLD AUTO: 4.67 MILLION/UL (ref 3.81–5.12)
SODIUM SERPL-SCNC: 137 MMOL/L (ref 135–147)
WBC # BLD AUTO: 6.53 THOUSAND/UL (ref 4.31–10.16)

## 2023-09-10 PROCEDURE — 80053 COMPREHEN METABOLIC PANEL: CPT | Performed by: EMERGENCY MEDICINE

## 2023-09-10 PROCEDURE — 81025 URINE PREGNANCY TEST: CPT | Performed by: EMERGENCY MEDICINE

## 2023-09-10 PROCEDURE — 93005 ELECTROCARDIOGRAM TRACING: CPT

## 2023-09-10 PROCEDURE — 99285 EMERGENCY DEPT VISIT HI MDM: CPT | Performed by: EMERGENCY MEDICINE

## 2023-09-10 PROCEDURE — 71046 X-RAY EXAM CHEST 2 VIEWS: CPT

## 2023-09-10 PROCEDURE — 99285 EMERGENCY DEPT VISIT HI MDM: CPT

## 2023-09-10 PROCEDURE — 36415 COLL VENOUS BLD VENIPUNCTURE: CPT | Performed by: EMERGENCY MEDICINE

## 2023-09-10 PROCEDURE — 84484 ASSAY OF TROPONIN QUANT: CPT | Performed by: EMERGENCY MEDICINE

## 2023-09-10 PROCEDURE — 85025 COMPLETE CBC W/AUTO DIFF WBC: CPT | Performed by: EMERGENCY MEDICINE

## 2023-09-10 PROCEDURE — 96374 THER/PROPH/DIAG INJ IV PUSH: CPT

## 2023-09-10 RX ORDER — KETOROLAC TROMETHAMINE 30 MG/ML
15 INJECTION, SOLUTION INTRAMUSCULAR; INTRAVENOUS ONCE
Status: COMPLETED | OUTPATIENT
Start: 2023-09-10 | End: 2023-09-10

## 2023-09-10 RX ADMIN — KETOROLAC TROMETHAMINE 15 MG: 30 INJECTION, SOLUTION INTRAMUSCULAR; INTRAVENOUS at 08:07

## 2023-09-10 NOTE — ED PROVIDER NOTES
History  Chief Complaint   Patient presents with   • Chest Pain     Mid chest pain radiating to right side, started about one hour ago. Pt describes as a sharp shooting pain lasting a few seconds, now pain is dull      Pt is a 46yo F who presents for chest pain. Patient states she got up this morning and was feeling well. She states she was about to start making breakfast when she had a sudden, sharp pain in her central chest.  Patient states this "took her breath away". Patient states this pain subsided but now radiates around to the right side of her chest into her right back. She states the pain is still very present and is also worsened with deep breaths. Patient states it is also worsened with movement. Patient did not take anything for pain prior to arrival.  Patient denies any previous history of similar pain. Patient denies any known cardiac or pulmonary history. Patient denies history any blood clots, recent travel, recent hospitalization, or any hormonal medications. Patient denies any recent illness. Patient states she is otherwise been feeling well. Prior to Admission Medications   Prescriptions Last Dose Informant Patient Reported? Taking?    Prenatal Vit-Fe Fumarate-FA (PRENATAL COMPLETE PO)  Self Yes No   Sig: Take by mouth every morning    Patient not taking: Reported on 2022   famotidine (PEPCID) 20 mg tablet  Self Yes No   Si mg 2 (two) times a day    Patient not taking: Reported on 2022   fluticasone (FLOVENT HFA) 110 MCG/ACT inhaler   Yes No   Sig: Inhale 1 puff 2 (two) times a day Rinse mouth after use.   multivitamin (THERAGRAN) TABS   Yes No   Sig: Take 1 tablet by mouth daily   omeprazole (PriLOSEC) 40 MG capsule   No No   Sig: Take 1 capsule (40 mg total) by mouth daily   Patient not taking: Reported on 2022      Facility-Administered Medications: None       Past Medical History:   Diagnosis Date   • Anesthesia complication     difficulty waking up- "made up his own cocktail"   • Asthma     allergy/seasonal allergy   • GERD (gastroesophageal reflux disease)        Past Surgical History:   Procedure Laterality Date   • APPENDECTOMY  2011   • DILATION AND CURETTAGE OF UTERUS  2018    miscarriage   • EGD     • WISDOM TOOTH EXTRACTION      x4       Family History   Problem Relation Age of Onset   • Cancer Mother         cervical   • Liver disease Father         x2 liver transplants   • Diabetes Father    • Hepatitis Father         C   • Esophageal cancer Paternal Uncle    • Stomach cancer Paternal Uncle    • Esophageal cancer Paternal Uncle    • Stomach cancer Paternal Uncle    • Anxiety disorder Sister      I have reviewed and agree with the history as documented. E-Cigarette/Vaping   • E-Cigarette Use Never User      E-Cigarette/Vaping Substances   • Nicotine No    • THC No    • CBD No    • Flavoring No    • Other No    • Unknown No      Social History     Tobacco Use   • Smoking status: Never   • Smokeless tobacco: Never   Vaping Use   • Vaping Use: Never used   Substance Use Topics   • Alcohol use: Yes     Comment: occasionally   • Drug use: Never       Review of Systems   Cardiovascular: Positive for chest pain. All other systems reviewed and are negative. Physical Exam  Physical Exam  Vitals reviewed. Constitutional:       General: She is not in acute distress. Appearance: She is well-developed. She is not diaphoretic. HENT:      Head: Normocephalic and atraumatic. Right Ear: External ear normal.      Left Ear: External ear normal.      Nose: Nose normal.      Mouth/Throat:      Mouth: Mucous membranes are moist.      Pharynx: Oropharynx is clear. Eyes:      Extraocular Movements: Extraocular movements intact. Pupils: Pupils are equal, round, and reactive to light. Cardiovascular:      Rate and Rhythm: Normal rate and regular rhythm. Heart sounds: Normal heart sounds.    Pulmonary:      Effort: Pulmonary effort is normal. No respiratory distress. Breath sounds: Normal breath sounds. Chest:      Chest wall: No tenderness. Abdominal:      General: There is no distension. Palpations: Abdomen is soft. Tenderness: There is no abdominal tenderness. There is no right CVA tenderness, left CVA tenderness, guarding or rebound. Musculoskeletal:         General: Normal range of motion. Cervical back: Normal range of motion and neck supple. Right lower leg: No edema. Left lower leg: No edema. Skin:     General: Skin is warm and dry. Capillary Refill: Capillary refill takes less than 2 seconds. Neurological:      General: No focal deficit present. Mental Status: She is alert and oriented to person, place, and time. Psychiatric:         Speech: Speech normal.         Behavior: Behavior is cooperative.          Vital Signs  ED Triage Vitals [09/10/23 0733]   Temperature Pulse Respirations Blood Pressure SpO2   98.4 °F (36.9 °C) 69 18 120/58 100 %      Temp Source Heart Rate Source Patient Position - Orthostatic VS BP Location FiO2 (%)   Oral Monitor Lying Left arm --      Pain Score       8           Vitals:    09/10/23 0830 09/10/23 0900 09/10/23 0930 09/10/23 1030   BP: 108/57 108/55 109/56 105/59   Pulse: 60 64 72 72   Patient Position - Orthostatic VS:             Visual Acuity      ED Medications  Medications   ketorolac (TORADOL) injection 15 mg (15 mg Intravenous Given 9/10/23 0807)       Diagnostic Studies  Results Reviewed     Procedure Component Value Units Date/Time    HS Troponin I 2hr [[id number]]  (Normal) Collected: 09/10/23 0935    Lab Status: Final result Specimen: Blood from Arm, Left Updated: 09/10/23 1003     hs TnI 2hr 2 ng/L      Delta 2hr hsTnI -1 ng/L     HS Troponin 0hr (reflex protocol) [[id number]]  (Normal) Collected: 09/10/23 0748    Lab Status: Final result Specimen: Blood from Arm, Right Updated: 09/10/23 0820     hs TnI 0hr 3 ng/L     Comprehensive metabolic panel [[id number]] Collected: 09/10/23 0748    Lab Status: Final result Specimen: Blood from Arm, Right Updated: 09/10/23 0815     Sodium 137 mmol/L      Potassium 3.6 mmol/L      Chloride 104 mmol/L      CO2 28 mmol/L      ANION GAP 5 mmol/L      BUN 14 mg/dL      Creatinine 0.69 mg/dL      Glucose 85 mg/dL      Calcium 9.4 mg/dL      AST 15 U/L      ALT 13 U/L      Alkaline Phosphatase 44 U/L      Total Protein 6.5 g/dL      Albumin 4.6 g/dL      Total Bilirubin 0.58 mg/dL      eGFR 112 ml/min/1.73sq m     Narrative:      National Kidney Disease Foundation guidelines for Chronic Kidney Disease (CKD):   •  Stage 1 with normal or high GFR (GFR > 90 mL/min/1.73 square meters)  •  Stage 2 Mild CKD (GFR = 60-89 mL/min/1.73 square meters)  •  Stage 3A Moderate CKD (GFR = 45-59 mL/min/1.73 square meters)  •  Stage 3B Moderate CKD (GFR = 30-44 mL/min/1.73 square meters)  •  Stage 4 Severe CKD (GFR = 15-29 mL/min/1.73 square meters)  •  Stage 5 End Stage CKD (GFR <15 mL/min/1.73 square meters)  Note: GFR calculation is accurate only with a steady state creatinine    POCT pregnancy, urine [[id number]]  (Normal) Resulted: 09/10/23 0802    Lab Status: Final result Updated: 09/10/23 0802     EXT Preg Test, Ur Negative     Control Valid    CBC and differential [[id number]] Collected: 09/10/23 0748    Lab Status: Final result Specimen: Blood from Arm, Right Updated: 09/10/23 0754     WBC 6.53 Thousand/uL      RBC 4.67 Million/uL      Hemoglobin 13.9 g/dL      Hematocrit 40.9 %      MCV 88 fL      MCH 29.8 pg      MCHC 34.0 g/dL      RDW 12.9 %      MPV 9.8 fL      Platelets 951 Thousands/uL      nRBC 0 /100 WBCs      Neutrophils Relative 53 %      Immat GRANS % 0 %      Lymphocytes Relative 35 %      Monocytes Relative 8 %      Eosinophils Relative 3 %      Basophils Relative 1 %      Neutrophils Absolute 3.55 Thousands/µL      Immature Grans Absolute 0.02 Thousand/uL      Lymphocytes Absolute 2.28 Thousands/µL      Monocytes Absolute 0.49 Thousand/µL      Eosinophils Absolute 0.16 Thousand/µL      Basophils Absolute 0.03 Thousands/µL                  XR chest 2 views   Final Result by Hari Milton MD (09/10 1224)      No acute cardiopulmonary disease. Workstation performed: [id number]                    Procedures  Procedures         ED Course  ED Course as of 09/10/23 1621   Sun Sep 10, 2023   0755 CBC and differential  Reviewed and without actionable derangement. 8241 PREGNANCY TEST URINE: Negative  Negative. 8351 Comprehensive metabolic panel  Reviewed and without actionable derangement. 0826 hs TnI 0hr: 3  WNL. Will require delta based on timing. 1205 Procedure Note: EKG  Date/Time: 09/10/23 8:52 AM   Interpreted by: Je Villela MD  Indications / Diagnosis: CP  ECG reviewed by me, the ED Physician: yes   The EKG demonstrates:  Rhythm: sinus bradycardia  Intervals: normal intervals  Axis: normal axis  QRS/Blocks: normal QRS  ST Changes: No acute ST Changes, no STD/KAREL.   0926 XR chest 2 views  No acute consolidation, effusion, or pneumothorax on wet read. 1769 Pt reassessed and states that she is comfortable now as she has been sitting in the same position but when she moves she still has pain. Discussed all results with pt as well as plan for DC if delta trop WNL. Pt agreeable. 101 [facility] hsTnI: -1  Okay for DC.               HEART Risk Score    Flowsheet Row Most Recent Value   Heart Score Risk Calculator    History 0 Filed at: 09/10/2023 1008   ECG 0 Filed at: 09/10/2023 1008   Age 0 Filed at: 09/10/2023 1008   Risk Factors 0 Filed at: 09/10/2023 1008   Troponin 0 Filed at: 09/10/2023 1008   HEART Score 0 Filed at: 09/10/2023 1008                PERC Rule for PE    Flowsheet Row Most Recent Value   PERC Rule for PE    Age >=50 0 Filed at: 09/10/2023 0742   HR >=100 0 Filed at: 09/10/2023 0742   O2 Sat on room air < 95% 0 Filed at: 09/10/2023 0742   History of PE or DVT 0 Filed at: 09/10/2023 0557 Recent trauma or surgery 0 Filed at: 09/10/2023 0742   Hemoptysis 0 Filed at: 09/10/2023 9176   Exogenous estrogen 0 Filed at: 09/10/2023 9160   Unilateral leg swelling 0 Filed at: 09/10/2023 4737   PERC Rule for PE Results 0 Filed at: 09/10/2023 5406                  Wells' Criteria for PE    Flowsheet Row Most Recent Value   Wells' Criteria for PE    Clinical signs and symptoms of DVT 0 Filed at: 09/10/2023 7381   PE is primary diagnosis or equally likely 0 Filed at: 09/10/2023 0741   HR >100 0 Filed at: 09/10/2023 0741   Immobilization at least 3 days or Surgery in the previous 4 weeks 0 Filed at: 09/10/2023 0741   Previous, objectively diagnosed PE or DVT 0 Filed at: 09/10/2023 0741   Hemoptysis 0 Filed at: 09/10/2023 0741   Malignancy with treatment within 6 months or palliative 0 Filed at: 09/10/2023 0741   Wells' Criteria Total 0 Filed at: 09/10/2023 0741                Medical Decision Making  Pt is a 48yo F who presents with chest pain. Exam unremarkable. Differential diagnosis to include but not limited to PE, pneumothorax, ACS, myocarditis, pericarditis, arrhythmia. Will obtain cardiac work-up including troponin, EKG, and chest x-ray. Patient is Andre Deem low and PERCs out and therefore does not require further work-up for PE at this time. Will treat symptomatically and reassess. See ED course for results and details. Plan to discharge pt with f/u to PCP. Discussed returning the ED with new or worsening of symptoms. Discussed use of over the counter medications as stated on the bottle as needed for pain. Pt expressed understanding of discharge instructions, return precautions, and medication instructions and is stable for discharge at this time. All questions were answered and pt was discharged without incident. Amount and/or Complexity of Data Reviewed  Labs: ordered. Decision-making details documented in ED Course. Radiology: ordered.  Decision-making details documented in ED Course. Risk  Prescription drug management. Disposition  Final diagnoses:   Chest pain     Time reflects when diagnosis was documented in both MDM as applicable and the Disposition within this note     Time User Action Codes Description Comment    9/10/2023 10:09 AM Jose Ashton Add [R07.9] Chest pain       ED Disposition     ED Disposition   Discharge    Condition   Stable    Date/Time   Sun Sep 10, 2023 10:08 AM    Comment   Rob Jaime discharge to home/self care. Follow-up Information     Follow up With Specialties Details Why Contact Info    [address] [place] [address] Medicine Call on 9/11/2023  [address] [place] [address]  [phone]            Discharge Medication List as of 9/10/2023 10:13 AM      CONTINUE these medications which have NOT CHANGED    Details   famotidine (PEPCID) 20 mg tablet 20 mg 2 (two) times a day , Starting Thu 1/14/2021, Historical Med      fluticasone (FLOVENT HFA) 110 MCG/ACT inhaler Inhale 1 puff 2 (two) times a day Rinse mouth after use., Historical Med      multivitamin (THERAGRAN) TABS Take 1 tablet by mouth daily, Historical Med      omeprazole (PriLOSEC) 40 MG capsule Take 1 capsule (40 mg total) by mouth daily, Starting Wed 3/24/2021, Normal      Prenatal Vit-Fe Fumarate-FA (PRENATAL COMPLETE PO) Take by mouth every morning , Historical Med             No discharge procedures on file.     PDMP Review     None          ED Provider  Electronically Signed by           Emory Mejia MD  09/10/23 7666

## 2023-09-10 NOTE — DISCHARGE INSTRUCTIONS
Follow-up with primary care for further care. Contact info provided below if needed. Use over the counter medications as stated on the bottle as needed for pain control. Return to the ED with new or worsening symptoms.

## 2023-09-11 LAB
ATRIAL RATE: 59 BPM
P AXIS: 35 DEGREES
PR INTERVAL: 160 MS
QRS AXIS: 74 DEGREES
QRSD INTERVAL: 80 MS
QT INTERVAL: 424 MS
QTC INTERVAL: 419 MS
T WAVE AXIS: 43 DEGREES
VENTRICULAR RATE: 59 BPM

## 2023-09-11 PROCEDURE — 93010 ELECTROCARDIOGRAM REPORT: CPT | Performed by: INTERNAL MEDICINE

## 2024-10-01 ENCOUNTER — OFFICE VISIT (OUTPATIENT)
Age: 39
End: 2024-10-01
Payer: COMMERCIAL

## 2024-10-01 VITALS — BODY MASS INDEX: 23.05 KG/M2 | TEMPERATURE: 97.4 F | WEIGHT: 135 LBS | HEIGHT: 64 IN

## 2024-10-01 DIAGNOSIS — D22.60 MULTIPLE BENIGN MELANOCYTIC NEVI OF UPPER EXTREMITY, LOWER EXTREMITY, AND TRUNK: ICD-10-CM

## 2024-10-01 DIAGNOSIS — L81.4 LENTIGO: Primary | ICD-10-CM

## 2024-10-01 DIAGNOSIS — L82.1 SEBORRHEIC KERATOSIS: ICD-10-CM

## 2024-10-01 DIAGNOSIS — D22.5 MULTIPLE BENIGN MELANOCYTIC NEVI OF UPPER EXTREMITY, LOWER EXTREMITY, AND TRUNK: ICD-10-CM

## 2024-10-01 DIAGNOSIS — D22.70 MULTIPLE BENIGN MELANOCYTIC NEVI OF UPPER EXTREMITY, LOWER EXTREMITY, AND TRUNK: ICD-10-CM

## 2024-10-01 PROCEDURE — 99213 OFFICE O/P EST LOW 20 MIN: CPT | Performed by: DERMATOLOGY

## 2024-10-01 NOTE — PROGRESS NOTES
"[facility] Note     Patient Name: Ludmila Bryan  Encounter Date: 10/1/2024     Have you been cared for by a [facility] Dermatologist in the last 3 years and, if so, which description applies to you?    Yes.  I have been here within the last 3 years, and my medical history has NOT changed since that time.  I am FEMALE/of child-bearing potential.    REVIEW OF SYSTEMS:  Have you recently had or currently have any of the following? No changes in my recent health.   PAST MEDICAL HISTORY:  Have you personally ever had or currently have any of the following?  If \"YES,\" then please provide more detail. No changes in my medical history.   HISTORY OF IMMUNOSUPPRESSION: Do you have a history of any of the following:  Systemic Immunosuppression such as Diabetes, Biologic or Immunotherapy, Chemotherapy, Organ Transplantation, Bone Marrow Transplantation or Prednisone?  No     Answering \"YES\" requires the addition of the dotphrase \"IMMUNOSUPPRESSED\" as the first diagnosis of the patient's visit.   FAMILY HISTORY:  Any \"first degree relatives\" (parent, brother, sister, or child) with the following?    No changes in my family's known health.   PATIENT EXPERIENCE:    Do you want the Dermatologist to perform a COMPLETE skin exam today including a clinical examination under the \"bra and underwear\" areas?  Yes  If necessary, do we have your permission to call and leave a detailed message on your Preferred Phone number that includes your specific medical information?  Yes      Allergies   Allergen Reactions    Dicloxacillin Hives    Nexium [Esomeprazole] Abdominal Pain    Strawberry C [Ascorbate - Food Allergy] Hives      Current Outpatient Medications:     fluticasone (FLOVENT HFA) 110 MCG/ACT inhaler, Inhale 1 puff 2 (two) times a day Rinse mouth after use., Disp: , Rfl:     multivitamin (THERAGRAN) TABS, Take 1 tablet by mouth daily, Disp: , Rfl:     famotidine (PEPCID) 20 mg tablet, 20 mg 2 (two) times a day  " "(Patient not taking: Reported on 7/12/2022), Disp: , Rfl:     omeprazole (PriLOSEC) 40 MG capsule, Take 1 capsule (40 mg total) by mouth daily (Patient not taking: Reported on 7/12/2022), Disp: 30 capsule, Rfl: 3    Prenatal Vit-Fe Fumarate-FA (PRENATAL COMPLETE PO), Take by mouth every morning  (Patient not taking: Reported on 7/12/2022), Disp: , Rfl:           Whom besides the patient is providing clinical information about today's encounter?   NO ADDITIONAL HISTORIAN (patient alone provided history)    Physical Exam and Assessment/Plan by Diagnosis:      SEBORRHEIC KERATOSIS; NON-INFLAMED    Physical Exam:  Anatomic Location Affected:  left torso  Morphological Description:  Flat and raised, waxy, smooth to warty textured, yellow to brownish-grey to dark brown to blackish, discrete, \"stuck-on\" appearing papules.  Pertinent Positives:  Pertinent Negatives:    Additional History of Present Condition:  Patient reports new bumps on the skin.  she notes that she has a new mole on her left torso, she notes that its been presetn fro 6 months.  Assessment and Plan:  Based on a thorough discussion of this condition and the management approach to it (including a comprehensive discussion of the known risks, side effects and potential benefits of treatment), the patient (family) agrees to implement the following specific plan:  Reassured benign     Seborrheic Keratosis  A seborrheic keratosis is a harmless warty spot that appears during adult life as a common sign of skin aging.  Seborrheic keratoses can arise on any area of skin, covered or uncovered, with the usual exception of the palms and soles. They do not arise from mucous membranes. Seborrheic keratoses can have highly variable appearance.      Seborrheic keratoses are extremely common. It has been estimated that over 90% of adults over the age of 60 years have one or more of them. They occur in males and females of all races, typically beginning to erupt in the 30s " "or 40s. They are uncommon under the age of 20 years.  The precise cause of seborrhoeic keratoses is not known.  Seborrhoeic keratoses are considered degenerative in nature. As time goes by, seborrheic keratoses tend to become more numerous. Some people inherit a tendency to develop a very large number of them; some people may have hundreds of them.    The name \"seborrheic keratosis\" is misleading, because these lesions are not limited to a seborrhoeic distribution (scalp, mid-face, chest, upper back), nor are they formed from sebaceous glands, nor are they associated with sebum -- which is greasy.  Seborrheic keratosis may also be called \"SK,\" \"Seb K,\" \"basal cell papilloma,\" \"senile wart,\" or \"barnacle.\"      Researchers have noted:  Eruptive seborrhoeic keratoses can follow sunburn or dermatitis  Skin friction may be the reason they appear in body folds  Viral cause (e.g., human papillomavirus) seems unlikely  Stable and clonal mutations or activation of FRFR3, PIK3CA, RONNIE, AKT1 and EGFR genes are found in seborrhoeic keratoses  Seborrhoeic keratosis can arise from solar lentigo  FRFR3 mutations also arise in solar lentigines. These mutations are associated with increased age and location on the head and neck, suggesting a role of ultraviolet radiation in these lesions  Seborrheic keratoses do not harbour tumour suppressor gene mutations  Epidermal growth factor receptor inhibitors, which are used to treat some cancers, often result in an increase in verrucal (warty) keratoses.    There is no easy way to remove multiple lesions on a single occasion.  Unless a specific lesion is \"inflamed\" and is causing pain or stinging/burning or is bleeding, most insurance companies do not authorize treatment.     MELANOCYTIC NEVI (\"Moles\")    Physical Exam:  Anatomic Location Affected:   Mostly on sun-exposed areas of the trunk and extremities  Morphological Description:  Scattered, 1-4mm round to ovoid, symmetrical-appearing, " "even bordered, skin colored to dark brown macules/papules, mostly in sun-exposed areas  Pertinent Positives:  Pertinent Negatives:    Additional History of Present Condition:      Assessment and Plan:  Based on a thorough discussion of this condition and the management approach to it (including a comprehensive discussion of the known risks, side effects and potential benefits of treatment), the patient (family) agrees to implement the following specific plan:  When outside we recommend using a wide brim hat, sunglasses, long sleeve and pants, sunscreen with SPF 30+ with reapplication every 2 hours, or SPF specific clothing   Benign, reassured  Annual skin check     Melanocytic Nevi  Melanocytic nevi (\"moles\") are tan or brown, raised or flat areas of the skin which have an increased number of melanocytes. Melanocytes are the cells in our body which make pigment and account for skin color.    Some moles are present at birth (I.e., \"congenital nevi\"), while others come up later in life (i.e., \"acquired nevi\").  The sun can stimulate the body to make more moles.  Sunburns are not the only thing that triggers more moles.  Chronic sun exposure can do it too.     Clinically distinguishing a healthy mole from melanoma may be difficult, even for experienced dermatologists. The \"ABCDE's\" of moles have been suggested as a means of helping to alert a person to a suspicious mole and the possible increased risk of melanoma.  The suggestions for raising alert are as follows:    Asymmetry: Healthy moles tend to be symmetric, while melanomas are often asymmetric.  Asymmetry means if you draw a line through the mole, the two halves do not match in color, size, shape, or surface texture. Asymmetry can be a result of rapid enlargement of a mole, the development of a raised area on a previously flat lesion, scaling, ulceration, bleeding or scabbing within the mole.  Any mole that starts to demonstrate \"asymmetry\" should be examined " "promptly by a board certified dermatologist.     Border: Healthy moles tend to have discrete, even borders.  The border of a melanoma often blends into the normal skin and does not sharply delineate the mole from normal skin.  Any mole that starts to demonstrate \"uneven borders\" should be examined promptly by a board certified dermatologist.     Color: Healthy moles tend to be one color throughout.  Melanomas tend to be made up of different colors ranging from dark black, blue, white, or red.  Any mole that demonstrates a color change should be examined promptly by a board certified dermatologist.     Diameter: Healthy moles tend to be smaller than 0.6 cm in size; an exception are \"congenital nevi\" that can be larger.  Melanomas tend to grow and can often be greater than 0.6 cm (1/4 of an inch, or the size of a pencil eraser). This is only a guideline, and many normal moles may be larger than 0.6 cm without being unhealthy.  Any mole that starts to change in size (small to bigger or bigger to smaller) should be examined promptly by a board certified dermatologist.     Evolving: Healthy moles tend to \"stay the same.\"  Melanomas may often show signs of change or evolution such as a change in size, shape, color, or elevation.  Any mole that starts to itch, bleed, crust, burn, hurt, or ulcerate or demonstrate a change or evolution should be examined promptly by a board certified dermatologist.        LENTIGO    Physical Exam:  Anatomic Location Affected:  trunk, arms  Morphological Description:  Light brown macules  Pertinent Positives:  Pertinent Negatives:    Additional History of Present Condition:      Assessment and Plan:  Based on a thorough discussion of this condition and the management approach to it (including a comprehensive discussion of the known risks, side effects and potential benefits of treatment), the patient (family) agrees to implement the following specific plan:  When outside we recommend using a " wide brim hat, sunglasses, long sleeve and pants, sunscreen with SPF 30+ with reapplication every 2 hours, or SPF specific clothing       What is a lentigo?  A lentigo is a pigmented flat or slightly raised lesion with a clearly defined edge. Unlike an ephelis (freckle), it does not fade in the winter months. There are several kinds of lentigo.  The name lentigo originally referred to its appearance resembling a small lentil. The plural of lentigo is lentigines, although “lentigos” is also in common use.    Who gets lentigines?  Lentigines can affect males and females of all ages and races. Solar lentigines are especially prevalent in fair skinned adults. Lentigines associated with syndromes are present at birth or arise during childhood.    What causes lentigines?  Common forms of lentigo are due to exposure to ultraviolet radiation:  Sun damage including sunburn   Indoor tanning   Phototherapy, especially photochemotherapy (PUVA)    Ionizing radiation, eg radiation therapy, can also cause lentigines.  Several familial syndromes associated with widespread lentigines originate from mutations in Indra-MAP kinase, mTOR signaling and PTEN pathways.    What is the treatment for lentigines?  Most lentigines are left alone. Attempts to lighten them may not be successful. The following approaches are used:  SPF 50+ broad-spectrum sunscreen   Hydroquinone bleaching cream   Alpha hydroxy acids   Vitamin C   Retinoids   Azelaic acid   Chemical peels  Individual lesions can be permanently removed using:  Cryotherapy   Intense pulsed light   Pigment lasers    How can lentigines be prevented?  Lentigines associated with exposure ultraviolet radiation can be prevented by very careful sun protection. Clothing is more successful at preventing new lentigines than are sunscreens.    What is the outlook for lentigines?  Lentigines usually persist. They may increase in number with age and sun exposure. Some in sun-protected sites may  "fade and disappear.    LUNSFORD ANGIOMAS    Physical Exam:  Anatomic Location Affected:  trunk  Morphological Description:  Scattered cherry red, 1-4 mm papules.  Pertinent Positives:  Pertinent Negatives:    Additional History of Present Condition:      Assessment and Plan:  Based on a thorough discussion of this condition and the management approach to it (including a comprehensive discussion of the known risks, side effects and potential benefits of treatment), the patient (family) agrees to implement the following specific plan:  Monitor for changes  Benign, reassured      Assessment and Plan:    Cherry angioma, also known as [place] spots, are benign vascular skin lesions. A \"cherry angioma\" is a firm red, blue or purple papule, 0.1-1 cm in diameter. When thrombosed, they can appear black in colour until evaluated with a dermatoscope when the red or purple colour is more easily seen. Cherry angioma may develop on any part of the body but most often appear on the scalp, face, lips and trunk.  An angioma is due to proliferating endothelial cells; these are the cells that line the inside of a blood vessel.    Angiomas can arise in early life or later in life; the most common type of angioma is a cherry angioma.  Cherry angiomas are very common in males and females of any age or race. They are more noticeable in white skin than in skin of colour. They markedly increase in number from about the age of 40. There may be a family history of similar lesions. Eruptive cherry angiomas have been rarely reported to be associated with internal malignancy. The cause of angiomas is unknown. Genetic analysis of cherry angiomas has shown that they frequently carry specific somatic missense mutations in the GNAQ and GNA11 (Q209H) genes, which are involved in other vascular and melanocytic proliferations.      Scribe Attestation      I,:  Veronica Aguirre MA am acting as a scribe while in the presence of the " attending physician.:       I,:  Chen Lucia MD personally performed the services described in this documentation    as scribed in my presence.: